# Patient Record
(demographics unavailable — no encounter records)

---

## 2025-02-10 NOTE — HISTORY OF PRESENT ILLNESS
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  The patient was last seen by HATTIE Plunkett in April 2024. At that time, he was doing well. Since, he has had no stroke/TIA symptoms.  He remains on ASA, atorvastatin 80 mg daily. He is still drinking a bottle of wine every 2-3 days. He smokes marijuana but denies using other drugs. He is working from home for [employer].

## 2025-02-10 NOTE — ASSESSMENT
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  --Continue ASA, statin --Patient will arrange for f/u with Dr. Paris --Repeat MRA head/neck for f/u of L V3 psuedoaneurysm --Recheck stroke labs --Encouraged alcohol cessation, avoidance of substances --Encouraged health lifestyle (diet/regular exercise).  Follow-up 6 months

## 2025-02-10 NOTE — PHYSICAL EXAM
[FreeTextEntry1] : Alert. Fully oriented. Speech and language are intact. Cranial nerves II-XII are intact; mild R NLFF. Motor exam reveals intact strength with individual muscle testing in bilateral upper and lower extremities. Tone is normal. Sensation is intact to light touch in distal extremities. Finger-to-nose is intact. Rapid alternating movements are normal in the upper and lower extremities. Gait is normal.